# Patient Record
Sex: MALE | Race: BLACK OR AFRICAN AMERICAN
[De-identification: names, ages, dates, MRNs, and addresses within clinical notes are randomized per-mention and may not be internally consistent; named-entity substitution may affect disease eponyms.]

---

## 2020-09-21 ENCOUNTER — HOSPITAL ENCOUNTER (EMERGENCY)
Dept: HOSPITAL 82 - ED | Age: 45
Discharge: HOME | DRG: 554 | End: 2020-09-21
Payer: COMMERCIAL

## 2020-09-21 VITALS — HEIGHT: 71 IN | BODY MASS INDEX: 40.26 KG/M2 | WEIGHT: 287.59 LBS

## 2020-09-21 VITALS — SYSTOLIC BLOOD PRESSURE: 136 MMHG | DIASTOLIC BLOOD PRESSURE: 86 MMHG

## 2020-09-21 DIAGNOSIS — M10.071: Primary | ICD-10-CM

## 2020-12-21 ENCOUNTER — HOSPITAL ENCOUNTER (EMERGENCY)
Dept: HOSPITAL 82 - ED | Age: 45
Discharge: HOME | DRG: 178 | End: 2020-12-21
Payer: COMMERCIAL

## 2020-12-21 VITALS — DIASTOLIC BLOOD PRESSURE: 61 MMHG | SYSTOLIC BLOOD PRESSURE: 98 MMHG

## 2020-12-21 VITALS — WEIGHT: 286.6 LBS | HEIGHT: 71 IN | BODY MASS INDEX: 40.12 KG/M2

## 2020-12-21 DIAGNOSIS — I47.1: ICD-10-CM

## 2020-12-21 DIAGNOSIS — M10.9: ICD-10-CM

## 2020-12-21 DIAGNOSIS — R07.1: ICD-10-CM

## 2020-12-21 DIAGNOSIS — U07.1: Primary | ICD-10-CM

## 2020-12-21 DIAGNOSIS — R09.1: ICD-10-CM

## 2020-12-21 LAB
ALBUMIN SERPL-MCNC: 4.3 G/DL (ref 3.2–5)
ALP SERPL-CCNC: 111 U/L (ref 38–126)
ANION GAP SERPL CALCULATED.3IONS-SCNC: 13 MMOL/L
APTT PPP: 23.9 SECONDS (ref 20–32.5)
AST SERPL-CCNC: 43 U/L (ref 17–59)
BASOPHILS NFR BLD AUTO: 1 % (ref 0–3)
BUN SERPL-MCNC: 16 MG/DL (ref 9–20)
BUN/CREAT SERPL: 15
CHLORIDE SERPL-SCNC: 108 MMOL/L (ref 95–108)
CO2 SERPL-SCNC: 25 MMOL/L (ref 22–30)
CREAT SERPL-MCNC: 1.1 MG/DL (ref 0.7–1.3)
D DIMER PPP FEU-MCNC: 0.34 MG/L (ref 0.19–0.6)
EOSINOPHIL NFR BLD AUTO: 2 % (ref 0–8)
ERYTHROCYTE [DISTWIDTH] IN BLOOD BY AUTOMATED COUNT: 13.3 % (ref 11.5–15.5)
HCT VFR BLD AUTO: 48.6 % (ref 39–50)
HGB BLD-MCNC: 15.9 G/DL (ref 14–18)
IMM GRANULOCYTES NFR BLD: 0.3 % (ref 0–5)
INR PPP: 1 RATIO (ref 0.7–1.3)
LYMPHOCYTES NFR BLD: 47 % (ref 15–41)
MCH RBC QN AUTO: 28.1 PG  CALC (ref 26–32)
MCHC RBC AUTO-ENTMCNC: 32.7 G/DL CAL (ref 32–36)
MCV RBC AUTO: 85.9 FL  CALC (ref 80–100)
MONOCYTES NFR BLD AUTO: 8 % (ref 2–13)
MYOGLOBIN SERPL-MCNC: 30 NG/ML (ref 0–121)
NEUTROPHILS # BLD AUTO: 2.55 THOU/UL (ref 1.82–7.42)
NEUTROPHILS NFR BLD AUTO: 43 % (ref 42–76)
PLATELET # BLD AUTO: 254 THOU/UL (ref 130–400)
POTASSIUM SERPL-SCNC: 4.4 MMOL/L (ref 3.5–5.1)
PROT SERPL-MCNC: 7.7 G/DL (ref 6.3–8.2)
PROTHROMBIN TIME: 9.6 SECONDS (ref 9–12.5)
RBC # BLD AUTO: 5.66 MILL/UL (ref 4.7–6.1)
SODIUM SERPL-SCNC: 141 MMOL/L (ref 137–146)

## 2021-08-22 ENCOUNTER — HOSPITAL ENCOUNTER (OUTPATIENT)
Dept: HOSPITAL 82 - ED | Age: 46
Setting detail: OBSERVATION
Discharge: HOME | DRG: 310 | End: 2021-08-22
Attending: STUDENT IN AN ORGANIZED HEALTH CARE EDUCATION/TRAINING PROGRAM | Admitting: STUDENT IN AN ORGANIZED HEALTH CARE EDUCATION/TRAINING PROGRAM
Payer: COMMERCIAL

## 2021-08-22 VITALS — HEIGHT: 71 IN | BODY MASS INDEX: 38.58 KG/M2 | WEIGHT: 275.58 LBS

## 2021-08-22 VITALS — DIASTOLIC BLOOD PRESSURE: 65 MMHG | SYSTOLIC BLOOD PRESSURE: 132 MMHG

## 2021-08-22 DIAGNOSIS — M10.9: ICD-10-CM

## 2021-08-22 DIAGNOSIS — Z20.822: ICD-10-CM

## 2021-08-22 DIAGNOSIS — I47.1: Primary | ICD-10-CM

## 2021-08-22 DIAGNOSIS — R09.1: ICD-10-CM

## 2021-08-22 LAB
ALBUMIN SERPL-MCNC: 4.6 G/DL (ref 3.2–5)
ALP SERPL-CCNC: 118 U/L (ref 38–126)
ANION GAP SERPL CALCULATED.3IONS-SCNC: 17 MMOL/L
AST SERPL-CCNC: 34 U/L (ref 17–59)
BASOPHILS NFR BLD AUTO: 1 % (ref 0–3)
BILIRUB UR QL STRIP.AUTO: NEGATIVE
BUN SERPL-MCNC: 20 MG/DL (ref 9–20)
BUN/CREAT SERPL: 17
CHLORIDE SERPL-SCNC: 104 MMOL/L (ref 95–108)
CO2 SERPL-SCNC: 21 MMOL/L (ref 22–30)
COLOR UR AUTO: YELLOW
CREAT SERPL-MCNC: 1.2 MG/DL (ref 0.7–1.3)
EOSINOPHIL NFR BLD AUTO: 1 % (ref 0–8)
ERYTHROCYTE [DISTWIDTH] IN BLOOD BY AUTOMATED COUNT: 14.2 % (ref 11.5–15.5)
GLUCOSE UR STRIP.AUTO-MCNC: NEGATIVE MG/DL
HCT VFR BLD AUTO: 48.7 % (ref 39–50)
HGB BLD-MCNC: 16.3 G/DL (ref 14–18)
HGB UR QL STRIP.AUTO: NEGATIVE
IMM GRANULOCYTES NFR BLD: 0.7 % (ref 0–5)
INR PPP: 1 RATIO (ref 0.7–1.3)
KETONES UR STRIP.AUTO-MCNC: NEGATIVE MG/DL
LEUKOCYTE ESTERASE UR QL STRIP.AUTO: NEGATIVE
LYMPHOCYTES NFR BLD: 34 % (ref 15–41)
MCH RBC QN AUTO: 29.2 PG  CALC (ref 26–32)
MCHC RBC AUTO-ENTMCNC: 33.5 G/DL CAL (ref 32–36)
MCV RBC AUTO: 87.3 FL  CALC (ref 80–100)
MONOCYTES NFR BLD AUTO: 11 % (ref 2–13)
NEUTROPHILS # BLD AUTO: 6.53 THOU/UL (ref 1.82–7.42)
NEUTROPHILS NFR BLD AUTO: 54 % (ref 42–76)
NITRITE UR QL STRIP.AUTO: NEGATIVE
PH UR STRIP.AUTO: 6.5 [PH] (ref 4.5–8)
PLATELET # BLD AUTO: 341 THOU/UL (ref 130–400)
POTASSIUM SERPL-SCNC: 3.8 MMOL/L (ref 3.5–5.1)
PROT SERPL-MCNC: 7.8 G/DL (ref 6.3–8.2)
PROT UR QL STRIP.AUTO: NEGATIVE MG/DL
PROTHROMBIN TIME: 10.2 SECONDS (ref 9–12.5)
RBC # BLD AUTO: 5.58 MILL/UL (ref 4.7–6.1)
SODIUM SERPL-SCNC: 139 MMOL/L (ref 137–146)
SP GR UR STRIP.AUTO: 1.01
UROBILINOGEN UR QL STRIP.AUTO: 0.2 E.U./DL

## 2021-08-22 NOTE — NUR
TO ROOM TO FOR TRIAGE.  PT HAS HX OF SVT...STATES RATE .  PLACED ON
STRETCHER AND HOOKED TO EKG MACHINE.  .  PT C/O CHEST TIGHTNESS AND SOB. 
IV STARTED WHILE  LOOKING AT EKG.

## 2021-08-22 NOTE — NUR
PATIENT DISCHARGED TO HOME. PATIENT GIVEN VERBAL AND WRITTEN INSTRUCTIONS ON
HOW TO PERFORM VAGAL MANUEVERS. PATIENT VERBALIZED UNDERSTANDING OG
INSTRUCTIONS. PATIENT DENIES AN PAIN OR PALPITATIONS UPON DISCHARGE. PATIENT
VOICES NO CONCERNS AT THIS TIME

## 2021-08-22 NOTE — NUR
D/C instructions given with verbalization of understanding.  Pt.  discharged
home in stable condition.

## 2021-08-22 NOTE — NUR
PT RESTING.  CM NSR.  PT GIVEN ASA/NITRO PASTE/MORPHINE/ZOFRAN FOR CHEST
TIGHTNESS.  PT CONNECTED TO PORTABLE DIAMAP WITH MONITOR.  LIGHTS DIMMED. 
BLANKETS GIVEN.  PT ADVISED OF THE BED/HOLDING SITUATION.   ADVISED PT THAT
HE WOULD BE A 24 HOUR HOLD TO OBSERVE.

## 2021-08-22 NOTE — NUR
2ND DOSE OF ADENOCARD GIVEN 12 MG IVP PT RATE SLOWED TO 77/NSR.  PT FELT MUCH
BETTER.  REPEAT EKG COMPLETED.

## 2021-08-22 NOTE — NUR
EKG DONE/IV STARTED/PT IN -150.  PT ON EKG MACHINE.  CRASH CART AT DOOR.
 ADENOCARD 6 MG IVP GIVEN WITH NO RESPONSE.

## 2022-01-18 ENCOUNTER — HOSPITAL ENCOUNTER (EMERGENCY)
Dept: HOSPITAL 82 - ED | Age: 47
Discharge: HOME | DRG: 554 | End: 2022-01-18
Payer: OTHER GOVERNMENT

## 2022-01-18 VITALS — DIASTOLIC BLOOD PRESSURE: 83 MMHG | SYSTOLIC BLOOD PRESSURE: 125 MMHG

## 2022-01-18 VITALS — BODY MASS INDEX: 40.12 KG/M2 | HEIGHT: 71 IN | WEIGHT: 286.6 LBS

## 2022-01-18 DIAGNOSIS — M10.072: Primary | ICD-10-CM
